# Patient Record
Sex: MALE | Race: OTHER | HISPANIC OR LATINO | Employment: FULL TIME | URBAN - METROPOLITAN AREA
[De-identification: names, ages, dates, MRNs, and addresses within clinical notes are randomized per-mention and may not be internally consistent; named-entity substitution may affect disease eponyms.]

---

## 2022-03-06 ENCOUNTER — HOSPITAL ENCOUNTER (EMERGENCY)
Facility: HOSPITAL | Age: 22
Discharge: HOME | End: 2022-03-06
Attending: EMERGENCY MEDICINE | Admitting: EMERGENCY MEDICINE

## 2022-03-06 VITALS
HEART RATE: 98 BPM | TEMPERATURE: 98.1 F | SYSTOLIC BLOOD PRESSURE: 137 MMHG | DIASTOLIC BLOOD PRESSURE: 70 MMHG | RESPIRATION RATE: 16 BRPM | OXYGEN SATURATION: 98 %

## 2022-03-06 DIAGNOSIS — S61.209A AVULSION OF FINGER TIP, INITIAL ENCOUNTER: Primary | ICD-10-CM

## 2022-03-06 PROCEDURE — 90715 TDAP VACCINE 7 YRS/> IM: CPT

## 2022-03-06 PROCEDURE — 99283 EMERGENCY DEPT VISIT LOW MDM: CPT

## 2022-03-06 PROCEDURE — 90471 IMMUNIZATION ADMIN: CPT

## 2022-03-06 PROCEDURE — 99284 EMERGENCY DEPT VISIT MOD MDM: CPT | Performed by: EMERGENCY MEDICINE

## 2022-03-06 RX ORDER — LIDOCAINE HYDROCHLORIDE AND EPINEPHRINE 10; 10 MG/ML; UG/ML
20 INJECTION, SOLUTION INFILTRATION; PERINEURAL ONCE
Status: COMPLETED | OUTPATIENT
Start: 2022-03-06 | End: 2022-03-06

## 2022-03-06 RX ADMIN — TETANUS TOXOID, REDUCED DIPHTHERIA TOXOID AND ACELLULAR PERTUSSIS VACCINE, ADSORBED 0.5 ML: 5; 2.5; 8; 8; 2.5 SUSPENSION INTRAMUSCULAR at 21:49

## 2022-03-06 RX ADMIN — LIDOCAINE HYDROCHLORIDE,EPINEPHRINE BITARTRATE 20 ML: 10; .01 INJECTION, SOLUTION INFILTRATION; PERINEURAL at 22:05

## 2022-03-07 NOTE — ED ATTENDING ATTESTATION
3/6/2022  Booker Gudino DO, saw and evaluated the patient  I have discussed the patient with the resident/non-physician practitioner and agree with the resident's/non-physician practitioner's findings, Plan of Care, and MDM as documented in the resident's/non-physician practitioner's note, except where noted  All available labs and Radiology studies were reviewed  I was present for key portions of any procedure(s) performed by the resident/non-physician practitioner and I was immediately available to provide assistance  At this point I agree with the current assessment done in the Emergency Department  I have conducted an independent evaluation of this patient a history and physical is as follows:    25 yo RHD male presents for avulsion of L thumb tip while cutting food with a knife just PTA  C/o burning pain to the area, worse with palpation  Has slow venous oozing from the site  No other c/o at this time  Imp: L thumb tip avulsion plan: soak in lido+epi, assess for hemostasis  Thin layer of petroleum jelly once hemostasis achieved  Gauze dressing  At home, continue wound care per [facility] - soap and water only, pat dry  Thin layer of petroleum jelly, gauze dressing  Repeat process at least twice daily  If dressing becomes soiled, change more frequently  References:  Chen Records [facility]  Secondary healing of fingertip amputations: a review  Hand (N Y)  2014 Sep;9(3):282-8  doi: 10 [id number]   PMID: [id number]; PMCID: [id number]  [email or web address]    ED Course         Critical Care Time  Procedures

## 2022-03-07 NOTE — ED PROVIDER NOTES
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 with a knife  Lac is wrapped and bleeding controlled at this time  42-year-old man with no PMH presents after left thumb injury  He was cutting food with a knife when he slipped and sliced the end of his thumb off  He was having difficulty controlling the bleeding, so he came to the ED for evaluation  He is reporting pain of the distal aspect of the digit but full ROM  Unsure of last tetanus  None       History reviewed  No pertinent past medical history  History reviewed  No pertinent surgical history  History reviewed  No pertinent family history  I have reviewed and agree with the history as documented  E-Cigarette/Vaping     E-Cigarette/Vaping Substances     Social History     Tobacco Use    Smoking status: Never Smoker    Smokeless tobacco: Never Used   Substance Use Topics    Alcohol use: Never    Drug use: Never        Review of Systems   Constitutional: Negative for chills and fever  HENT: Negative for ear pain and sore throat  Eyes: Negative for pain and visual disturbance  Respiratory: Negative for cough and shortness of breath  Cardiovascular: Negative for chest pain and palpitations  Gastrointestinal: Negative for abdominal pain, constipation, diarrhea and vomiting  Genitourinary: Negative for dysuria and hematuria  Musculoskeletal: Negative for arthralgias and back pain  Skin: Negative for color change and rash  Neurological: Negative for seizures, syncope and light-headedness  Psychiatric/Behavioral: Negative for agitation and confusion  All other systems reviewed and are negative        Physical Exam  ED Triage Vitals [03/06/22 2138]   Temperature Pulse Respirations Blood Pressure SpO2   98 1 °F (36 7 °C) 98 16 137/70 98 %      Temp Source Heart Rate Source Patient Position - Orthostatic VS BP Location FiO2 (%)   Oral Monitor Sitting Right arm --      Pain Score       --             Orthostatic Vital Signs  Vitals:    03/06/22 2138   BP: 137/70   Pulse: 98   Patient Position - Orthostatic VS: Sitting       Physical Exam  Vitals and nursing note reviewed  Constitutional:       Appearance: Normal appearance  HENT:      Head: Normocephalic and atraumatic  Right Ear: External ear normal       Left Ear: External ear normal    Eyes:      Extraocular Movements: Extraocular movements intact  Conjunctiva/sclera: Conjunctivae normal    Pulmonary:      Effort: Pulmonary effort is normal  No respiratory distress  Musculoskeletal:         General: Normal range of motion  Hands:       Cervical back: Normal range of motion and neck supple  Right lower leg: No edema  Left lower leg: No edema  Skin:     General: Skin is warm and dry  Neurological:      General: No focal deficit present  Mental Status: He is alert and oriented to person, place, and time  Psychiatric:         Mood and Affect: Mood normal          Behavior: Behavior normal          ED Medications  Medications   tetanus-diphtheria-acellular pertussis (BOOSTRIX) IM injection 0 5 mL (0 5 mL Intramuscular Given 3/6/22 2149)   lidocaine-epinephrine (XYLOCAINE/EPINEPHRINE) 1 %-1:100,000 injection 20 mL (20 mL Infiltration Given by Other 3/6/22 2205)       Diagnostic Studies  Results Reviewed     None                 No orders to display         Procedures  Procedures      ED Course             MDM  Number of Diagnoses or Management Options  Avulsion of finger tip, initial encounter  Diagnosis management comments: Avulsion injury of his left thumb  Cauterized the wound after lidocaine with epi was unable to stop bleeding  Will recommend treatment with vasoline, non-stick dressing, and gauze wrap for the next few weeks  Patient in agreement with plan and questions were answered  Verbalized understanding of return precautions      Portions or all of this note were generated using voice recognition software  Occasional wrong word or "sound a like" substitutions may have occurred due to the inherent limitations of voice recognition software  Please interpret any errors within the intended context of the whole sentence or idea  Disposition  Final diagnoses:   Avulsion of finger tip, initial encounter     Time reflects when diagnosis was documented in both MDM as applicable and the Disposition within this note     Time User Action Codes Description Comment    3/6/2022 10:36 PM Ariela Prime Add [M45 [id number]] Finger laceration     3/6/2022 10:40 PM [facility] [W72 [id number]] Finger laceration     3/6/2022 10:40 PM Ariela Prime Add [S61 [id number]] Avulsion of finger tip, initial encounter       ED Disposition     ED Disposition Condition Date/Time Comment    Discharge Stable Sun Mar 6, 2022 10:36 PM [facility] discharge to home/self care  Follow-up Information     Follow up With Specialties Details Why Contact Info Additional [address] Emergency Department Emergency Medicine Go to  If symptoms worsen [address] [place] [address]  [address] [place] [address] Emergency Department, [address], [address]   [phone]          Patient's Medications    No medications on file     No discharge procedures on file  PDMP Review     None           ED Provider  Attending physically available and evaluated One Fred Perez  I managed the patient along with the ED Attending      Electronically Signed by         Praneeth Mcclendon MD  03/06/22 5974

## 2022-03-07 NOTE — DISCHARGE INSTRUCTIONS
Cuidado de heridas: dos veces al día, limpie harmon herida solo con agua y [place]  No use yodo, peróxido ni ningún alcohol en la herida  Seque la herida después de limpiarla  Aplica beronica tee capa de vaselina sobre la herida y [place] cúbrela con beronica gasa limpia  Repita estos pasos al [place] veces al día hasta que la herida sane  Si el vendaje se ensucia o sangra, cámbielo antes  Wound care: twice daily, clean your wound with soap and water only  Do not use iodine, peroxide, or any alcohols on the wound  Pat the wound dry after cleaning it  Apply a thin layer of vaseline to the wound, then cover it with clean gauze  Repeat these steps at least twice daily until the wound is healed  If the bandage becomes soiled or bloody, change it sooner